# Patient Record
Sex: FEMALE | Race: WHITE | HISPANIC OR LATINO | URBAN - METROPOLITAN AREA
[De-identification: names, ages, dates, MRNs, and addresses within clinical notes are randomized per-mention and may not be internally consistent; named-entity substitution may affect disease eponyms.]

---

## 2018-10-16 ENCOUNTER — OFFICE VISIT (OUTPATIENT)
Dept: URGENT CARE | Age: 33
End: 2018-10-16
Payer: COMMERCIAL

## 2018-10-16 VITALS
OXYGEN SATURATION: 99 % | BODY MASS INDEX: 26.99 KG/M2 | DIASTOLIC BLOOD PRESSURE: 88 MMHG | WEIGHT: 162 LBS | RESPIRATION RATE: 18 BRPM | HEIGHT: 65 IN | HEART RATE: 72 BPM | SYSTOLIC BLOOD PRESSURE: 134 MMHG | TEMPERATURE: 98.2 F

## 2018-10-16 DIAGNOSIS — R30.0 DYSURIA: ICD-10-CM

## 2018-10-16 DIAGNOSIS — N39.0 ACUTE UTI: Primary | ICD-10-CM

## 2018-10-16 LAB
SL AMB  POCT GLUCOSE, UA: ABNORMAL
SL AMB LEUKOCYTE ESTERASE,UA: ABNORMAL
SL AMB POCT BILIRUBIN,UA: ABNORMAL
SL AMB POCT BLOOD,UA: ABNORMAL
SL AMB POCT CLARITY,UA: CLEAR
SL AMB POCT COLOR,UA: YELLOW
SL AMB POCT KETONES,UA: ABNORMAL
SL AMB POCT NITRITE,UA: ABNORMAL
SL AMB POCT PH,UA: 7.5
SL AMB POCT SPECIFIC GRAVITY,UA: 1.01
SL AMB POCT URINE PROTEIN: ABNORMAL
SL AMB POCT UROBILINOGEN: 0.2

## 2018-10-16 PROCEDURE — 99203 OFFICE O/P NEW LOW 30 MIN: CPT | Performed by: FAMILY MEDICINE

## 2018-10-16 PROCEDURE — 87077 CULTURE AEROBIC IDENTIFY: CPT | Performed by: PHYSICIAN ASSISTANT

## 2018-10-16 PROCEDURE — 87186 SC STD MICRODIL/AGAR DIL: CPT | Performed by: PHYSICIAN ASSISTANT

## 2018-10-16 PROCEDURE — 87086 URINE CULTURE/COLONY COUNT: CPT | Performed by: PHYSICIAN ASSISTANT

## 2018-10-16 RX ORDER — FLUVOXAMINE MALEATE 100 MG
1 TABLET ORAL
COMMUNITY
Start: 2016-08-19

## 2018-10-16 RX ORDER — FLUVOXAMINE MALEATE 100 MG/1
CAPSULE, EXTENDED RELEASE ORAL
COMMUNITY

## 2018-10-16 RX ORDER — CEPHALEXIN 500 MG/1
500 CAPSULE ORAL EVERY 12 HOURS SCHEDULED
Qty: 10 CAPSULE | Refills: 0 | Status: SHIPPED | OUTPATIENT
Start: 2018-10-16 | End: 2018-10-21

## 2018-10-16 RX ORDER — CLOMIPRAMINE HYDROCHLORIDE 50 MG/1
100 CAPSULE ORAL
COMMUNITY
Start: 2018-09-27

## 2018-10-16 RX ORDER — FLUVOXAMINE MALEATE 100 MG
300 TABLET ORAL
COMMUNITY
Start: 2018-07-30

## 2018-10-17 NOTE — PATIENT INSTRUCTIONS
Take all antibiotics as prescribed  Please take probiotics  Drink lots of clear fluids  Call us in 2 days for urine culture results    Call info link for Urologist in the area- [phone]  Go to ER if worsening symptoms, fevers, back pain, nausea, vomiting or other concerning symptoms

## 2018-10-17 NOTE — PROGRESS NOTES
[facility] [place] [facility]        NAME: Kaylene Velazquez is a 26 y o  female  : 1985    MRN: [id number]  DATE: 2018  TIME: 10:35 PM    Assessment and Plan   Acute UTI [N39 0]  1  Acute UTI  cephalexin (KEFLEX) 500 mg capsule   2  Dysuria  POCT urine dip    Urine culture     Urine dip shows moderate leuks, trace blood  Culture pending, patient aware to call in 2 days for results    Patient Instructions     Take all antibiotics as prescribed  Please take probiotics  Drink lots of clear fluids  Call us in 2 days for urine culture results  Call info link for Urologist in the area[address]  Go to ER if worsening symptoms, fevers, back pain, nausea, vomiting or other concerning symptoms     Chief Complaint     Chief Complaint   Patient presents with    Urinary Tract Infection     Frequent urination, burning, painful x 2 days  History of Present Illness       Urinary Tract Infection    This is a new problem  The current episode started in the past 7 days  The problem occurs intermittently  The problem has been waxing and waning  The quality of the pain is described as burning  The pain is at a severity of 2/10  Pain severity now: burning with urination, feels like has to go but a little comes out  There has been no fever  Sexually active: Denies STD risk, vaginal discharge or pain, denies pregnancy risk  There is no history of pyelonephritis  Associated symptoms include frequency  Pertinent negatives include no chills, discharge, flank pain, hematuria, hesitancy, nausea, possible pregnancy, sweats, urgency or vomiting  She has tried increased fluids for the symptoms  The treatment provided mild relief  There is no history of catheterization, kidney stones, a single kidney, urinary stasis or a urological procedure  Review of Systems   Review of Systems   Constitutional: Negative for chills and fever  Respiratory: Negative for shortness of breath      Cardiovascular: Negative for chest pain  Gastrointestinal: Negative for abdominal pain, diarrhea, nausea and vomiting  Genitourinary: Positive for dysuria and frequency  Negative for decreased urine volume, flank pain, genital sores, hematuria, hesitancy, pelvic pain, urgency, vaginal bleeding, vaginal discharge and vaginal pain  Musculoskeletal: Negative for back pain and myalgias  Neurological: Negative for dizziness, weakness and light-headedness  Current Medications       Current Outpatient Prescriptions:     Acetylcysteine 600 MG CAPS, Take 1,800 mg by mouth, Disp: , Rfl:     clomiPRAMINE (ANAFRANIL) 50 mg capsule, Take 100 mg by mouth, Disp: , Rfl:     Cyanocobalamin (VITAMIN B 12) 100 MCG LOZG, by Does not apply route, Disp: , Rfl:     fluvoxaMINE (LUVOX) 100 mg tablet, Take 1 tablet by mouth, Disp: , Rfl:     fluvoxaMINE (LUVOX) 100 mg tablet, Take 300 mg by mouth, Disp: , Rfl:     Fluvoxamine Maleate 100 MG CP24, Take by mouth, Disp: , Rfl:     cephalexin (KEFLEX) 500 mg capsule, Take 1 capsule (500 mg total) by mouth every 12 (twelve) hours for 5 days, Disp: 10 capsule, Rfl: 0    Current Allergies     Allergies as of 10/16/2018    (No Known Allergies)            The following portions of the patient's history were reviewed and updated as appropriate: allergies, current medications, past family history, past medical history, past social history, past surgical history and problem list      Past Medical History:   Diagnosis Date    Anxiety        History reviewed  No pertinent surgical history  Family History   Problem Relation Age of Onset    No Known Problems Mother     No Known Problems Father          Medications have been verified          Objective   /88 (BP Location: Left arm, Patient Position: Sitting)   Pulse 72   Temp 98 2 °F (36 8 °C) (Temporal)   Resp 18   Ht 5' 5" (1 651 m)   Wt 73 5 kg (162 lb)   SpO2 99%   BMI 26 96 kg/m²        Physical Exam     Physical Exam   Constitutional: She is oriented to person, place, and time  She appears well-developed and well-nourished  No distress  HENT:   Head: Normocephalic and atraumatic  Mouth/Throat: Oropharynx is clear and moist    Eyes: Pupils are equal, round, and reactive to light  Conjunctivae are normal    Neck: Normal range of motion  Neck supple  Cardiovascular: Normal rate, regular rhythm and normal heart sounds  Pulmonary/Chest: Effort normal and breath sounds normal  She has no wheezes  Abdominal: Soft  Bowel sounds are normal  There is no tenderness  There is no rebound  No CVA tenderness   Musculoskeletal: Normal range of motion  Neurological: She is alert and oriented to person, place, and time  Skin: Skin is warm and dry  Psychiatric: She has a normal mood and affect  Her behavior is normal    Nursing note and vitals reviewed

## 2018-10-18 LAB — BACTERIA UR CULT: ABNORMAL

## 2018-10-22 ENCOUNTER — TELEPHONE (OUTPATIENT)
Dept: URGENT CARE | Age: 33
End: 2018-10-22

## 2018-10-22 DIAGNOSIS — N39.0 ACUTE UTI: Primary | ICD-10-CM

## 2018-10-22 RX ORDER — NITROFURANTOIN 25; 75 MG/1; MG/1
100 CAPSULE ORAL 2 TIMES DAILY
Qty: 10 CAPSULE | Refills: 0 | Status: SHIPPED | OUTPATIENT
Start: 2018-10-22 | End: 2018-10-27

## 2018-10-22 NOTE — TELEPHONE ENCOUNTER
Called patient regarding test results as it appears she never called for the urine cx results as instructed  Resistant to keflex- need to switch abx when patient returns call    Patient return call at 2 o'clock p m   States she is feeling better  She did complete the Keflex and was taking Uristat  Denies any fevers, back pain, abdominal pain, fatigue or other complaints  Did discuss urine results at length  Keily [facility] [address] was called in for the patient  She agrees to follow up with family doctor or OB gyn to get urine retested  All patient's questions were answered    Patient verbalized good understanding

## 2021-02-26 DIAGNOSIS — Z23 ENCOUNTER FOR IMMUNIZATION: ICD-10-CM

## 2021-03-02 ENCOUNTER — IMMUNIZATIONS (OUTPATIENT)
Dept: FAMILY MEDICINE CLINIC | Facility: HOSPITAL | Age: 36
End: 2021-03-02

## 2021-03-02 DIAGNOSIS — Z23 ENCOUNTER FOR IMMUNIZATION: Primary | ICD-10-CM

## 2021-03-02 PROCEDURE — 91300 SARS-COV-2 / COVID-19 MRNA VACCINE (PFIZER-BIONTECH) 30 MCG: CPT

## 2021-03-02 PROCEDURE — 0001A SARS-COV-2 / COVID-19 MRNA VACCINE (PFIZER-BIONTECH) 30 MCG: CPT

## 2021-03-27 ENCOUNTER — IMMUNIZATIONS (OUTPATIENT)
Dept: FAMILY MEDICINE CLINIC | Facility: HOSPITAL | Age: 36
End: 2021-03-27

## 2021-03-27 DIAGNOSIS — Z23 ENCOUNTER FOR IMMUNIZATION: Primary | ICD-10-CM

## 2021-03-27 PROCEDURE — 91300 SARS-COV-2 / COVID-19 MRNA VACCINE (PFIZER-BIONTECH) 30 MCG: CPT

## 2021-03-27 PROCEDURE — 0002A SARS-COV-2 / COVID-19 MRNA VACCINE (PFIZER-BIONTECH) 30 MCG: CPT

## 2023-11-21 ENCOUNTER — APPOINTMENT (OUTPATIENT)
Dept: LAB | Age: 38
End: 2023-11-21
Payer: COMMERCIAL

## 2023-11-21 DIAGNOSIS — J31.0 RHINITIS, UNSPECIFIED TYPE: ICD-10-CM

## 2023-11-21 DIAGNOSIS — L30.9 DERMATITIS: ICD-10-CM

## 2023-11-21 PROCEDURE — 86003 ALLG SPEC IGE CRUDE XTRC EA: CPT

## 2023-11-21 PROCEDURE — 82785 ASSAY OF IGE: CPT

## 2023-11-21 PROCEDURE — 36415 COLL VENOUS BLD VENIPUNCTURE: CPT

## 2023-11-22 LAB

## 2023-11-28 LAB
A ALTERNATA IGE QN: <0.1 KU/L
A FUMIGATUS IGE QN: <0.1 KU/L
A PULLULANS IGE QN: <0.1 KU/L
C ALBICANS IGE QN: <0.1 KU/L
C HERBARUM IGE QN: <0.1 KU/L
E PURPURASCENS IGE QN: <0.1 KU/L
FUSARIUM PROLIFERATUM: <0.1 KU/L
Lab: NORMAL
M RACEMOSUS IGE QN: <0.1 KU/L
PENICILLIUM CHRYSOGENUM: <0.1 KU/L
PHOMA BETAE: <0.1 KU/L
SETOMELANOMMA ROSTRATA: <0.1 KU/L
STEMPHYLIUM HERBARUM: <0.1 KU/L